# Patient Record
Sex: MALE | Race: BLACK OR AFRICAN AMERICAN | NOT HISPANIC OR LATINO | Employment: UNEMPLOYED | URBAN - METROPOLITAN AREA
[De-identification: names, ages, dates, MRNs, and addresses within clinical notes are randomized per-mention and may not be internally consistent; named-entity substitution may affect disease eponyms.]

---

## 2017-05-04 ENCOUNTER — APPOINTMENT (OUTPATIENT)
Dept: GENERAL RADIOLOGY | Facility: HOSPITAL | Age: 27
End: 2017-05-04

## 2017-05-04 ENCOUNTER — APPOINTMENT (OUTPATIENT)
Dept: CT IMAGING | Facility: HOSPITAL | Age: 27
End: 2017-05-04

## 2017-05-04 ENCOUNTER — HOSPITAL ENCOUNTER (EMERGENCY)
Facility: HOSPITAL | Age: 27
Discharge: PSYCHIATRIC OR SUBSTANCE USE FACILITY | End: 2017-05-05
Attending: EMERGENCY MEDICINE | Admitting: EMERGENCY MEDICINE

## 2017-05-04 DIAGNOSIS — F31.64 BIPOLAR DISORDER, CURRENT EPISODE MIXED, SEVERE, WITH PSYCHOTIC FEATURES (HCC): ICD-10-CM
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
## 2017-05-04 LAB
AMPHET+METHAMPHET UR QL: NEGATIVE
BACTERIA UR QL AUTO: ABNORMAL /HPF
BARBITURATES UR QL SCN: NEGATIVE
BENZODIAZ UR QL SCN: NEGATIVE
BILIRUB UR QL STRIP: NEGATIVE
CANNABINOIDS SERPL QL: NEGATIVE
CLARITY UR: CLEAR
COCAINE UR QL: NEGATIVE
COLOR UR: ABNORMAL
ETHANOL BLD-MCNC: <10 MG/DL (ref 0–10)
ETHANOL UR QL: <0.01 %
GLUCOSE UR STRIP-MCNC: NEGATIVE MG/DL
HGB UR QL STRIP.AUTO: NEGATIVE
HYALINE CASTS UR QL AUTO: ABNORMAL /LPF
KETONES UR QL STRIP: ABNORMAL
LEUKOCYTE ESTERASE UR QL STRIP.AUTO: ABNORMAL
METHADONE UR QL SCN: NEGATIVE
NITRITE UR QL STRIP: NEGATIVE
OPIATES UR QL: NEGATIVE
OXYCODONE UR QL SCN: NEGATIVE
PH UR STRIP.AUTO: 6 [PH] (ref 5–8)
PROT UR QL STRIP: ABNORMAL
RBC # UR: ABNORMAL /HPF
REF LAB TEST METHOD: ABNORMAL
SP GR UR STRIP: >=1.03 (ref 1–1.03)
SQUAMOUS #/AREA URNS HPF: ABNORMAL /HPF
UROBILINOGEN UR QL STRIP: ABNORMAL
WBC UR QL AUTO: ABNORMAL /HPF

## 2017-05-05 ENCOUNTER — HOSPITAL ENCOUNTER (INPATIENT)
Facility: HOSPITAL | Age: 27
LOS: 10 days | Discharge: HOME | End: 2017-05-15
Attending: SPECIALIST | Admitting: PSYCHIATRY & NEUROLOGY

## 2017-05-05 VITALS
HEART RATE: 68 BPM | OXYGEN SATURATION: 98 % | WEIGHT: 238 LBS | DIASTOLIC BLOOD PRESSURE: 84 MMHG | BODY MASS INDEX: 29.59 KG/M2 | RESPIRATION RATE: 18 BRPM | SYSTOLIC BLOOD PRESSURE: 132 MMHG | HEIGHT: 75 IN | TEMPERATURE: 98.3 F

## 2017-05-05 PROBLEM — F31.9 BIPOLAR DISORDER WITH PSYCHOTIC FEATURES (HCC): Status: ACTIVE | Noted: 2017-05-05

## 2017-05-05 RX ORDER — MAGNESIUM HYDROXIDE/ALUMINUM HYDROXICE/SIMETHICONE 120; 1200; 1200 MG/30ML; MG/30ML; MG/30ML
30 SUSPENSION ORAL EVERY 6 HOURS PRN
Status: DISCONTINUED | OUTPATIENT
Start: 2017-05-05 | End: 2017-05-15 | Stop reason: HOSPADM

## 2017-05-05 RX ORDER — LURASIDONE HYDROCHLORIDE 20 MG/1
20 TABLET, FILM COATED ORAL DAILY
Status: DISCONTINUED | OUTPATIENT
Start: 2017-05-05 | End: 2017-05-08

## 2017-05-05 RX ORDER — OLANZAPINE 10 MG/1
10 TABLET, ORALLY DISINTEGRATING ORAL ONCE
Status: COMPLETED | OUTPATIENT
Start: 2017-05-05 | End: 2017-05-05

## 2017-05-05 RX ORDER — ACETAMINOPHEN 325 MG/1
650 TABLET ORAL EVERY 4 HOURS PRN
Status: DISCONTINUED | OUTPATIENT
Start: 2017-05-05 | End: 2017-05-15 | Stop reason: HOSPADM

## 2017-05-05 RX ADMIN — OLANZAPINE 10 MG: 10 TABLET, ORALLY DISINTEGRATING ORAL at 02:09

## 2017-05-05 RX ADMIN — LURASIDONE HYDROCHLORIDE 20 MG: 20 TABLET, FILM COATED ORAL at 11:02

## 2017-05-06 PROBLEM — N17.9 AKI (ACUTE KIDNEY INJURY) (HCC): Status: ACTIVE | Noted: 2017-05-06

## 2017-05-06 LAB
ALBUMIN SERPL-MCNC: 4.4 G/DL (ref 3.5–5.2)
ALBUMIN/GLOB SERPL: 1.8 G/DL
ALP SERPL-CCNC: 79 U/L (ref 39–117)
ALT SERPL W P-5'-P-CCNC: 13 U/L (ref 1–41)
ANION GAP SERPL CALCULATED.3IONS-SCNC: 10.6 MMOL/L
AST SERPL-CCNC: 16 U/L (ref 1–40)
BACTERIA SPEC AEROBE CULT: NO GROWTH
BASOPHILS # BLD AUTO: 0.02 10*3/MM3 (ref 0–0.2)
BASOPHILS NFR BLD AUTO: 0.3 % (ref 0–1.5)
BILIRUB SERPL-MCNC: 0.9 MG/DL (ref 0.1–1.2)
BUN BLD-MCNC: 13 MG/DL (ref 6–20)
BUN/CREAT SERPL: 8.4 (ref 7–25)
CALCIUM SPEC-SCNC: 9.2 MG/DL (ref 8.6–10.5)
CHLORIDE SERPL-SCNC: 102 MMOL/L (ref 98–107)
CO2 SERPL-SCNC: 27.4 MMOL/L (ref 22–29)
CREAT BLD-MCNC: 1.54 MG/DL (ref 0.76–1.27)
DEPRECATED RDW RBC AUTO: 40.7 FL (ref 37–54)
EOSINOPHIL # BLD AUTO: 0.12 10*3/MM3 (ref 0–0.7)
EOSINOPHIL NFR BLD AUTO: 2 % (ref 0.3–6.2)
ERYTHROCYTE [DISTWIDTH] IN BLOOD BY AUTOMATED COUNT: 12.5 % (ref 11.5–14.5)
GFR SERPL CREATININE-BSD FRML MDRD: 66 ML/MIN/1.73
GLOBULIN UR ELPH-MCNC: 2.5 GM/DL
GLUCOSE BLD-MCNC: 103 MG/DL (ref 65–99)
HCT VFR BLD AUTO: 44.9 % (ref 40.4–52.2)
HGB BLD-MCNC: 14.3 G/DL (ref 13.7–17.6)
IMM GRANULOCYTES # BLD: 0 10*3/MM3 (ref 0–0.03)
IMM GRANULOCYTES NFR BLD: 0 % (ref 0–0.5)
LYMPHOCYTES # BLD AUTO: 1.44 10*3/MM3 (ref 0.9–4.8)
LYMPHOCYTES NFR BLD AUTO: 24.2 % (ref 19.6–45.3)
MCH RBC QN AUTO: 28.5 PG (ref 27–32.7)
MCHC RBC AUTO-ENTMCNC: 31.8 G/DL (ref 32.6–36.4)
MCV RBC AUTO: 89.6 FL (ref 79.8–96.2)
MONOCYTES # BLD AUTO: 0.61 10*3/MM3 (ref 0.2–1.2)
MONOCYTES NFR BLD AUTO: 10.3 % (ref 5–12)
NEUTROPHILS # BLD AUTO: 3.75 10*3/MM3 (ref 1.9–8.1)
NEUTROPHILS NFR BLD AUTO: 63.2 % (ref 42.7–76)
PLATELET # BLD AUTO: 114 10*3/MM3 (ref 140–500)
PMV BLD AUTO: 10.9 FL (ref 6–12)
POTASSIUM BLD-SCNC: 4.3 MMOL/L (ref 3.5–5.2)
PROT SERPL-MCNC: 6.9 G/DL (ref 6–8.5)
RBC # BLD AUTO: 5.01 10*6/MM3 (ref 4.6–6)
SODIUM BLD-SCNC: 140 MMOL/L (ref 136–145)
TSH SERPL DL<=0.05 MIU/L-ACNC: 0.99 MIU/ML (ref 0.27–4.2)
WBC NRBC COR # BLD: 5.94 10*3/MM3 (ref 4.5–10.7)

## 2017-05-06 PROCEDURE — 84443 ASSAY THYROID STIM HORMONE: CPT | Performed by: INTERNAL MEDICINE

## 2017-05-06 PROCEDURE — 85025 COMPLETE CBC W/AUTO DIFF WBC: CPT | Performed by: INTERNAL MEDICINE

## 2017-05-06 PROCEDURE — 80053 COMPREHEN METABOLIC PANEL: CPT | Performed by: INTERNAL MEDICINE

## 2017-05-06 RX ORDER — SODIUM CHLORIDE 9 MG/ML
100 INJECTION, SOLUTION INTRAVENOUS CONTINUOUS
Status: ACTIVE | OUTPATIENT
Start: 2017-05-06 | End: 2017-05-06

## 2017-05-06 RX ORDER — OLANZAPINE 10 MG/1
10 INJECTION, POWDER, LYOPHILIZED, FOR SOLUTION INTRAMUSCULAR ONCE
Status: COMPLETED | OUTPATIENT
Start: 2017-05-06 | End: 2017-05-06

## 2017-05-06 RX ADMIN — OLANZAPINE 10 MG: 10 INJECTION, POWDER, FOR SOLUTION INTRAMUSCULAR at 10:06

## 2017-05-07 LAB
ANION GAP SERPL CALCULATED.3IONS-SCNC: 10.6 MMOL/L
BUN BLD-MCNC: 13 MG/DL (ref 6–20)
BUN/CREAT SERPL: 8 (ref 7–25)
CALCIUM SPEC-SCNC: 9.1 MG/DL (ref 8.6–10.5)
CHLORIDE SERPL-SCNC: 101 MMOL/L (ref 98–107)
CO2 SERPL-SCNC: 28.4 MMOL/L (ref 22–29)
CREAT BLD-MCNC: 1.62 MG/DL (ref 0.76–1.27)
CREAT UR-MCNC: 325.9 MG/DL
GFR SERPL CREATININE-BSD FRML MDRD: 62 ML/MIN/1.73
GLUCOSE BLD-MCNC: 100 MG/DL (ref 65–99)
POTASSIUM BLD-SCNC: 4.4 MMOL/L (ref 3.5–5.2)
PROT UR-MCNC: 9 MG/DL
PROT/CREAT UR: 27.6 MG/G CREA
SODIUM BLD-SCNC: 140 MMOL/L (ref 136–145)

## 2017-05-07 PROCEDURE — 76775 US EXAM ABDO BACK WALL LIM: CPT

## 2017-05-07 PROCEDURE — 82570 ASSAY OF URINE CREATININE: CPT | Performed by: INTERNAL MEDICINE

## 2017-05-07 PROCEDURE — 84156 ASSAY OF PROTEIN URINE: CPT | Performed by: INTERNAL MEDICINE

## 2017-05-07 PROCEDURE — 80048 BASIC METABOLIC PNL TOTAL CA: CPT | Performed by: INTERNAL MEDICINE

## 2017-05-07 RX ORDER — SODIUM CHLORIDE 9 MG/ML
100 INJECTION, SOLUTION INTRAVENOUS CONTINUOUS
Status: ACTIVE | OUTPATIENT
Start: 2017-05-07 | End: 2017-05-07

## 2017-05-07 RX ORDER — OLANZAPINE 5 MG/1
10 TABLET, ORALLY DISINTEGRATING ORAL ONCE
Status: COMPLETED | OUTPATIENT
Start: 2017-05-07 | End: 2017-05-07

## 2017-05-07 RX ADMIN — OLANZAPINE 10 MG: 5 TABLET, ORALLY DISINTEGRATING ORAL at 16:12

## 2017-05-07 RX ADMIN — ACETAMINOPHEN 650 MG: 325 TABLET ORAL at 17:19

## 2017-05-07 RX ADMIN — SODIUM CHLORIDE 100 ML/HR: 9 INJECTION, SOLUTION INTRAVENOUS at 10:43

## 2017-05-07 RX ADMIN — SODIUM CHLORIDE 100 ML/HR: 9 INJECTION, SOLUTION INTRAVENOUS at 20:23

## 2017-05-08 PROBLEM — D69.1 THROMBOCYTOPATHIA (HCC): Status: ACTIVE | Noted: 2017-05-08

## 2017-05-08 LAB
ANION GAP SERPL CALCULATED.3IONS-SCNC: 10.5 MMOL/L
BUN BLD-MCNC: 12 MG/DL (ref 6–20)
BUN/CREAT SERPL: 8.2 (ref 7–25)
CALCIUM SPEC-SCNC: 8.9 MG/DL (ref 8.6–10.5)
CHLORIDE SERPL-SCNC: 104 MMOL/L (ref 98–107)
CO2 SERPL-SCNC: 26.5 MMOL/L (ref 22–29)
CREAT BLD-MCNC: 1.46 MG/DL (ref 0.76–1.27)
GFR SERPL CREATININE-BSD FRML MDRD: 70 ML/MIN/1.73
GLUCOSE BLD-MCNC: 94 MG/DL (ref 65–99)
HBA1C MFR BLD: 4.38 % (ref 4.8–5.6)
POTASSIUM BLD-SCNC: 4.1 MMOL/L (ref 3.5–5.2)
SODIUM BLD-SCNC: 141 MMOL/L (ref 136–145)

## 2017-05-08 PROCEDURE — 83036 HEMOGLOBIN GLYCOSYLATED A1C: CPT | Performed by: INTERNAL MEDICINE

## 2017-05-08 PROCEDURE — 80048 BASIC METABOLIC PNL TOTAL CA: CPT | Performed by: INTERNAL MEDICINE

## 2017-05-08 RX ORDER — LURASIDONE HYDROCHLORIDE 40 MG/1
40 TABLET, FILM COATED ORAL DAILY
Status: DISCONTINUED | OUTPATIENT
Start: 2017-05-09 | End: 2017-05-09

## 2017-05-08 RX ADMIN — LURASIDONE HYDROCHLORIDE 20 MG: 20 TABLET, FILM COATED ORAL at 09:01

## 2017-05-09 LAB
ANION GAP SERPL CALCULATED.3IONS-SCNC: 12.5 MMOL/L
BASOPHILS # BLD AUTO: 0.02 10*3/MM3 (ref 0–0.2)
BASOPHILS NFR BLD AUTO: 0.4 % (ref 0–1.5)
BUN BLD-MCNC: 11 MG/DL (ref 6–20)
BUN/CREAT SERPL: 8.3 (ref 7–25)
CALCIUM SPEC-SCNC: 8.7 MG/DL (ref 8.6–10.5)
CHLORIDE SERPL-SCNC: 102 MMOL/L (ref 98–107)
CO2 SERPL-SCNC: 25.5 MMOL/L (ref 22–29)
CREAT BLD-MCNC: 1.33 MG/DL (ref 0.76–1.27)
DEPRECATED RDW RBC AUTO: 38 FL (ref 37–54)
EOSINOPHIL # BLD AUTO: 0.15 10*3/MM3 (ref 0–0.7)
EOSINOPHIL NFR BLD AUTO: 2.9 % (ref 0.3–6.2)
ERYTHROCYTE [DISTWIDTH] IN BLOOD BY AUTOMATED COUNT: 11.9 % (ref 11.5–14.5)
GFR SERPL CREATININE-BSD FRML MDRD: 78 ML/MIN/1.73
GLUCOSE BLD-MCNC: 125 MG/DL (ref 65–99)
HCT VFR BLD AUTO: 40.9 % (ref 40.4–52.2)
HGB BLD-MCNC: 13.4 G/DL (ref 13.7–17.6)
IMM GRANULOCYTES # BLD: 0 10*3/MM3 (ref 0–0.03)
IMM GRANULOCYTES NFR BLD: 0 % (ref 0–0.5)
LYMPHOCYTES # BLD AUTO: 2.11 10*3/MM3 (ref 0.9–4.8)
LYMPHOCYTES NFR BLD AUTO: 41.1 % (ref 19.6–45.3)
MCH RBC QN AUTO: 28.8 PG (ref 27–32.7)
MCHC RBC AUTO-ENTMCNC: 32.8 G/DL (ref 32.6–36.4)
MCV RBC AUTO: 88 FL (ref 79.8–96.2)
MONOCYTES # BLD AUTO: 0.42 10*3/MM3 (ref 0.2–1.2)
MONOCYTES NFR BLD AUTO: 8.2 % (ref 5–12)
NEUTROPHILS # BLD AUTO: 2.44 10*3/MM3 (ref 1.9–8.1)
NEUTROPHILS NFR BLD AUTO: 47.4 % (ref 42.7–76)
PLATELET # BLD AUTO: 122 10*3/MM3 (ref 140–500)
PMV BLD AUTO: 11.3 FL (ref 6–12)
POTASSIUM BLD-SCNC: 3.5 MMOL/L (ref 3.5–5.2)
RBC # BLD AUTO: 4.65 10*6/MM3 (ref 4.6–6)
SODIUM BLD-SCNC: 140 MMOL/L (ref 136–145)
WBC NRBC COR # BLD: 5.14 10*3/MM3 (ref 4.5–10.7)

## 2017-05-09 PROCEDURE — 85025 COMPLETE CBC W/AUTO DIFF WBC: CPT | Performed by: INTERNAL MEDICINE

## 2017-05-09 PROCEDURE — 80048 BASIC METABOLIC PNL TOTAL CA: CPT | Performed by: INTERNAL MEDICINE

## 2017-05-09 RX ORDER — OLANZAPINE 7.5 MG/1
15 TABLET ORAL NIGHTLY
Status: DISCONTINUED | OUTPATIENT
Start: 2017-05-09 | End: 2017-05-11

## 2017-05-09 RX ORDER — OLANZAPINE 10 MG/1
10 INJECTION, POWDER, LYOPHILIZED, FOR SOLUTION INTRAMUSCULAR EVERY 8 HOURS PRN
Status: COMPLETED | OUTPATIENT
Start: 2017-05-09 | End: 2017-05-11

## 2017-05-09 RX ADMIN — LURASIDONE HYDROCHLORIDE 40 MG: 40 TABLET, FILM COATED ORAL at 08:57

## 2017-05-09 RX ADMIN — OLANZAPINE 15 MG: 7.5 TABLET, FILM COATED ORAL at 22:56

## 2017-05-10 RX ORDER — HYDROXYZINE PAMOATE 25 MG/1
50 CAPSULE ORAL EVERY 4 HOURS PRN
Status: DISCONTINUED | OUTPATIENT
Start: 2017-05-10 | End: 2017-05-15 | Stop reason: HOSPADM

## 2017-05-10 RX ADMIN — HYDROXYZINE PAMOATE 50 MG: 25 CAPSULE ORAL at 09:40

## 2017-05-10 RX ADMIN — OLANZAPINE 15 MG: 7.5 TABLET, FILM COATED ORAL at 20:55

## 2017-05-11 RX ORDER — OLANZAPINE 10 MG/1
20 TABLET ORAL NIGHTLY
Status: DISCONTINUED | OUTPATIENT
Start: 2017-05-11 | End: 2017-05-15 | Stop reason: HOSPADM

## 2017-05-11 RX ADMIN — OLANZAPINE 10 MG: 10 INJECTION, POWDER, FOR SOLUTION INTRAMUSCULAR at 14:08

## 2017-05-11 RX ADMIN — OLANZAPINE 20 MG: 10 TABLET, FILM COATED ORAL at 20:30

## 2017-05-12 RX ORDER — OLANZAPINE 10 MG/1
10 INJECTION, POWDER, LYOPHILIZED, FOR SOLUTION INTRAMUSCULAR EVERY 8 HOURS PRN
Status: COMPLETED | OUTPATIENT
Start: 2017-05-12 | End: 2017-05-12

## 2017-05-12 RX ADMIN — OLANZAPINE 20 MG: 10 TABLET, FILM COATED ORAL at 21:39

## 2017-05-12 RX ADMIN — HYDROXYZINE PAMOATE 50 MG: 25 CAPSULE ORAL at 21:39

## 2017-05-12 RX ADMIN — OLANZAPINE 10 MG: 10 INJECTION, POWDER, FOR SOLUTION INTRAMUSCULAR at 14:25

## 2017-05-13 RX ADMIN — OLANZAPINE 20 MG: 10 TABLET, FILM COATED ORAL at 21:23

## 2017-05-14 RX ADMIN — OLANZAPINE 20 MG: 10 TABLET, FILM COATED ORAL at 21:25

## 2017-05-15 VITALS
BODY MASS INDEX: 27.62 KG/M2 | RESPIRATION RATE: 16 BRPM | WEIGHT: 221 LBS | SYSTOLIC BLOOD PRESSURE: 102 MMHG | HEART RATE: 52 BPM | DIASTOLIC BLOOD PRESSURE: 70 MMHG | TEMPERATURE: 96.5 F | OXYGEN SATURATION: 100 %

## 2017-05-15 RX ORDER — HYDROXYZINE PAMOATE 50 MG/1
50 CAPSULE ORAL EVERY 4 HOURS PRN
Qty: 75 CAPSULE | Refills: 0 | Status: SHIPPED | OUTPATIENT
Start: 2017-05-15

## 2017-05-15 RX ORDER — OLANZAPINE 20 MG/1
20 TABLET ORAL NIGHTLY
Qty: 30 TABLET | Refills: 1 | Status: SHIPPED | OUTPATIENT
Start: 2017-05-15

## 2017-05-16 ENCOUNTER — TELEPHONE (OUTPATIENT)
Dept: PSYCHIATRY | Facility: HOSPITAL | Age: 27
End: 2017-05-16

## 2018-02-09 ENCOUNTER — HOSPITAL ENCOUNTER (EMERGENCY)
Facility: HOSPITAL | Age: 28
Discharge: HOME | End: 2018-02-09
Attending: EMERGENCY MEDICINE | Admitting: EMERGENCY MEDICINE

## 2018-02-09 VITALS
HEART RATE: 73 BPM | OXYGEN SATURATION: 96 % | HEIGHT: 75 IN | DIASTOLIC BLOOD PRESSURE: 84 MMHG | WEIGHT: 238 LBS | RESPIRATION RATE: 16 BRPM | TEMPERATURE: 98.4 F | SYSTOLIC BLOOD PRESSURE: 146 MMHG | BODY MASS INDEX: 29.59 KG/M2

## 2018-02-09 DIAGNOSIS — F31.70 BIPOLAR DISORDER IN PARTIAL REMISSION, MOST RECENT EPISODE UNSPECIFIED TYPE (HCC): Primary | ICD-10-CM

## 2018-02-09 LAB
AMPHET+METHAMPHET UR QL: NEGATIVE
BARBITURATES UR QL SCN: NEGATIVE
BENZODIAZ UR QL SCN: NEGATIVE
CANNABINOIDS SERPL QL: NEGATIVE
COCAINE UR QL: NEGATIVE
ETHANOL BLD-MCNC: <10 MG/DL (ref 0–10)
ETHANOL UR QL: <0.01 %
HOLD SPECIMEN: NORMAL
HOLD SPECIMEN: NORMAL
METHADONE UR QL SCN: NEGATIVE
OPIATES UR QL: NEGATIVE
OXYCODONE UR QL SCN: NEGATIVE
WHOLE BLOOD HOLD SPECIMEN: NORMAL
WHOLE BLOOD HOLD SPECIMEN: NORMAL

## 2018-02-09 PROCEDURE — 36415 COLL VENOUS BLD VENIPUNCTURE: CPT | Performed by: EMERGENCY MEDICINE

## 2018-02-09 PROCEDURE — 80307 DRUG TEST PRSMV CHEM ANLYZR: CPT | Performed by: EMERGENCY MEDICINE

## 2018-02-09 PROCEDURE — 90791 PSYCH DIAGNOSTIC EVALUATION: CPT

## 2018-02-09 PROCEDURE — 99283 EMERGENCY DEPT VISIT LOW MDM: CPT

## 2018-02-09 RX ORDER — OLANZAPINE 10 MG/1
10 TABLET ORAL NIGHTLY
Qty: 30 TABLET | Refills: 0 | Status: SHIPPED | OUTPATIENT
Start: 2018-02-09
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
## 2018-02-10 NOTE — DISCHARGE INSTRUCTIONS
Home, rest, home medicine as prescribed, follow up with resources given to you by the [facility].  Return to care with further concerns.

## 2018-02-10 NOTE — ED NOTES
"Pt states that occasionally he will experience a \"trigger\" that makes him feel really anxious and violent.  States he \"gets scared\" about what he \"might do\"  Denies having any suicidal or homicidal ideation at present.  Parents in the room add that patient was to have a court ordered psychiatric evaluation prior to being released from [facility] recently that was never done, so they are bringing him here for evaluation       Zaira Herrera RN  02/09/18 [id number]    "

## 2018-11-10 ENCOUNTER — HOSPITAL ENCOUNTER (EMERGENCY)
Facility: HOSPITAL | Age: 28
Discharge: HOME | End: 2018-11-11
Attending: EMERGENCY MEDICINE | Admitting: EMERGENCY MEDICINE

## 2018-11-10 DIAGNOSIS — R55 NEAR SYNCOPE: Primary | ICD-10-CM

## 2018-11-10 DIAGNOSIS — R53.1 GENERALIZED WEAKNESS: ICD-10-CM

## 2018-11-10 PROCEDURE — 99283 EMERGENCY DEPT VISIT LOW MDM: CPT

## 2018-11-11 VITALS
SYSTOLIC BLOOD PRESSURE: 122 MMHG | OXYGEN SATURATION: 99 % | WEIGHT: 225 LBS | HEART RATE: 63 BPM | HEIGHT: 75 IN | TEMPERATURE: 96.5 F | RESPIRATION RATE: 16 BRPM | BODY MASS INDEX: 27.98 KG/M2 | DIASTOLIC BLOOD PRESSURE: 72 MMHG

## 2018-11-11 LAB
ALBUMIN SERPL-MCNC: 4.1 G/DL (ref 3.5–5.2)
ALBUMIN/GLOB SERPL: 1.6 G/DL
ALP SERPL-CCNC: 62 U/L (ref 39–117)
ALT SERPL W P-5'-P-CCNC: 11 U/L (ref 1–41)
ANION GAP SERPL CALCULATED.3IONS-SCNC: 9.2 MMOL/L
AST SERPL-CCNC: 18 U/L (ref 1–40)
BASOPHILS # BLD AUTO: 0.02 10*3/MM3 (ref 0–0.2)
BASOPHILS NFR BLD AUTO: 0.3 % (ref 0–1.5)
BILIRUB SERPL-MCNC: 0.7 MG/DL (ref 0.1–1.2)
BUN BLD-MCNC: 12 MG/DL (ref 6–20)
BUN/CREAT SERPL: 8.6 (ref 7–25)
CALCIUM SPEC-SCNC: 8.8 MG/DL (ref 8.6–10.5)
CHLORIDE SERPL-SCNC: 102 MMOL/L (ref 98–107)
CO2 SERPL-SCNC: 28.8 MMOL/L (ref 22–29)
CREAT BLD-MCNC: 1.39 MG/DL (ref 0.76–1.27)
DEPRECATED RDW RBC AUTO: 38.1 FL (ref 37–54)
EOSINOPHIL # BLD AUTO: 0.12 10*3/MM3 (ref 0–0.7)
EOSINOPHIL NFR BLD AUTO: 2 % (ref 0.3–6.2)
ERYTHROCYTE [DISTWIDTH] IN BLOOD BY AUTOMATED COUNT: 11.9 % (ref 11.5–14.5)
GFR SERPL CREATININE-BSD FRML MDRD: 73 ML/MIN/1.73
GLOBULIN UR ELPH-MCNC: 2.5 GM/DL
GLUCOSE BLD-MCNC: 109 MG/DL (ref 65–99)
HCT VFR BLD AUTO: 40.8 % (ref 40.4–52.2)
HGB BLD-MCNC: 13.2 G/DL (ref 13.7–17.6)
IMM GRANULOCYTES # BLD: 0 10*3/MM3 (ref 0–0.03)
IMM GRANULOCYTES NFR BLD: 0 % (ref 0–0.5)
LYMPHOCYTES # BLD AUTO: 2.35 10*3/MM3 (ref 0.9–4.8)
LYMPHOCYTES NFR BLD AUTO: 38.7 % (ref 19.6–45.3)
MCH RBC QN AUTO: 28.8 PG (ref 27–32.7)
MCHC RBC AUTO-ENTMCNC: 32.4 G/DL (ref 32.6–36.4)
MCV RBC AUTO: 88.9 FL (ref 79.8–96.2)
MONOCYTES # BLD AUTO: 0.55 10*3/MM3 (ref 0.2–1.2)
MONOCYTES NFR BLD AUTO: 9 % (ref 5–12)
NEUTROPHILS # BLD AUTO: 3.04 10*3/MM3 (ref 1.9–8.1)
NEUTROPHILS NFR BLD AUTO: 50 % (ref 42.7–76)
PLATELET # BLD AUTO: 126 10*3/MM3 (ref 140–500)
PMV BLD AUTO: 11.2 FL (ref 6–12)
POTASSIUM BLD-SCNC: 3.7 MMOL/L (ref 3.5–5.2)
PROT SERPL-MCNC: 6.6 G/DL (ref 6–8.5)
RBC # BLD AUTO: 4.59 10*6/MM3 (ref 4.6–6)
SODIUM BLD-SCNC: 140 MMOL/L (ref 136–145)
WBC NRBC COR # BLD: 6.08 10*3/MM3 (ref 4.5–10.7)

## 2018-11-11 PROCEDURE — 80053 COMPREHEN METABOLIC PANEL: CPT | Performed by: EMERGENCY MEDICINE

## 2018-11-11 PROCEDURE — 93005 ELECTROCARDIOGRAM TRACING: CPT | Performed by: EMERGENCY MEDICINE

## 2018-11-11 PROCEDURE — 36415 COLL VENOUS BLD VENIPUNCTURE: CPT

## 2018-11-11 PROCEDURE — 93010 ELECTROCARDIOGRAM REPORT: CPT | Performed by: INTERNAL MEDICINE

## 2018-11-11 PROCEDURE — 85025 COMPLETE CBC W/AUTO DIFF WBC: CPT | Performed by: EMERGENCY MEDICINE

## 2018-11-11 NOTE — ED PROVIDER NOTES
" EMERGENCY DEPARTMENT ENCOUNTER    CHIEF COMPLAINT  Chief Complaint: Syncope  History given by: pt  History limited by: none  Room Number: 10/10  PMD: Provider, No Known      HPI:  Pt is a 28 y.o. male who presents complaining of syncope today. Pt reports he has not been sleeping well lately and reports when he was driving tonight he pulled over to take a nap and when he woke up he felt like he \"blacked out\" and then felt his heart racing. Pt reports now he feels better. Pt denies any pain or discomfort.    Duration:  PTA  Onset: gradual  Quality: \"blacked out\"  Intensity/Severity: mild  Progression: progressively better  Associated Symptoms: lack of sleep  Aggravating Factors: none  Alleviating Factors: none  Previous Episodes: none  Treatment before arrival: none    PAST MEDICAL HISTORY  Active Ambulatory Problems     Diagnosis Date Noted   • Bipolar disorder with psychotic features (CMS/[facility]) 05/05/2017   • JACQUELYN (acute kidney injury) (CMS/[facility]) 05/06/2017   • Thrombocytopathia (CMS/[facility]) 05/08/2017     Resolved Ambulatory Problems     Diagnosis Date Noted   • No Resolved Ambulatory Problems     Past Medical History:   Diagnosis Date   • Bipolar disorder (CMS/[facility])    • 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 No pertinent surgical history.    FAMILY HISTORY  No family history on file.    SOCIAL HISTORY  Social History     Socioeconomic History   • Marital status: Single     Spouse name: Not on file   • Number of children: Not on file   • Years of education: Not on file   • Highest education level: Not on file   Social Needs   • Financial resource strain: Not on file   • Food insecurity - worry: Not on file   • Food insecurity - inability: Not on file   • Transportation needs - medical: Not on file   • Transportation needs - non-medical: Not on file   Occupational History   • Not on file   Tobacco Use   • Smoking status: Former Smoker   • Tobacco comment: quit " March 7   Substance and Sexual Activity   • Alcohol use: No   • Drug use: Yes     Types: Marijuana   • Sexual activity: Defer   Other Topics Concern   • Not on file   Social History Narrative   • Not on file       ALLERGIES  Latex    REVIEW OF SYSTEMS  Review of Systems   Constitutional: Negative for activity change, appetite change and fever.   HENT: Negative for congestion and sore throat.    Eyes: Negative.    Respiratory: Negative for cough and shortness of breath.    Cardiovascular: Negative for chest pain and leg swelling.   Gastrointestinal: Negative for abdominal pain, diarrhea and vomiting.   Endocrine: Negative.    Genitourinary: Negative for decreased urine volume and dysuria.   Musculoskeletal: Negative for neck pain.   Skin: Negative for rash and wound.   Allergic/Immunologic: Negative.    Neurological: Positive for syncope. Negative for weakness, numbness and headaches.   Hematological: Negative.    Psychiatric/Behavioral: Positive for sleep disturbance.   All other systems reviewed and are negative.      PHYSICAL EXAM  ED Triage Vitals [11/10/18 2355]   Temp Heart Rate Resp BP SpO2   96.5 °F (35.8 °C) 63 16 135/96 100 %      Temp src Heart Rate Source Patient Position BP Location FiO2 (%)   Tympanic -- -- -- --       Physical Exam   Constitutional: He is oriented to person, place, and time. No distress.   HENT:   Head: Normocephalic and atraumatic.   Eyes: EOM are normal. Pupils are equal, round, and reactive to light.   Neck: Normal range of motion. Neck supple.   Cardiovascular: Normal rate, regular rhythm and normal heart sounds.   Pulmonary/Chest: Effort normal and breath sounds normal. No respiratory distress.   Abdominal: Soft. There is no tenderness. There is no rebound and no guarding.   Musculoskeletal: Normal range of motion. He exhibits no edema.   Neurological: He is alert and oriented to person, place, and time. He has normal sensation and normal strength.   Skin: Skin is warm and dry.    Psychiatric: Mood and affect normal.   Nursing note and vitals reviewed.      LAB RESULTS  Lab Results (last 24 hours)     Procedure Component Value Units Date/Time    CBC & Differential [[id number]] Collected:  11/11/18 0039    Specimen:  Blood Updated:  11/11/18 0055    Narrative:       The following orders were created for panel order CBC & Differential.  Procedure                               Abnormality         Status                     ---------                               -----------         ------                     CBC Auto Differential[[id number]]        Abnormal            Final result                 Please view results for these tests on the individual orders.    Comprehensive Metabolic Panel [[id number]]  (Abnormal) Collected:  11/11/18 0039    Specimen:  Blood Updated:  11/11/18 0117     Glucose 109 mg/dL      BUN 12 mg/dL      Creatinine 1.39 mg/dL      Sodium 140 mmol/L      Potassium 3.7 mmol/L      Chloride 102 mmol/L      CO2 28.8 mmol/L      Calcium 8.8 mg/dL      Total Protein 6.6 g/dL      Albumin 4.10 g/dL      ALT (SGPT) 11 U/L      AST (SGOT) 18 U/L      Alkaline Phosphatase 62 U/L      Total Bilirubin 0.7 mg/dL      eGFR  African Amer 73 mL/min/1.73      Globulin 2.5 gm/dL      A/G Ratio 1.6 g/dL      BUN/Creatinine Ratio 8.6     Anion Gap 9.2 mmol/L     CBC Auto Differential [[id number]]  (Abnormal) Collected:  11/11/18 0039    Specimen:  Blood Updated:  11/11/18 0055     WBC 6.08 10*3/mm3      RBC 4.59 10*6/mm3      Hemoglobin 13.2 g/dL      Hematocrit 40.8 %      MCV 88.9 fL      MCH 28.8 pg      MCHC 32.4 g/dL      RDW 11.9 %      RDW-SD 38.1 fl      MPV 11.2 fL      Platelets 126 10*3/mm3      Neutrophil % 50.0 %      Lymphocyte % 38.7 %      Monocyte % 9.0 %      Eosinophil % 2.0 %      Basophil % 0.3 %      Immature Grans % 0.0 %      Neutrophils, Absolute 3.04 10*3/mm3      Lymphocytes, Absolute 2.35 10*3/mm3      Monocytes, Absolute 0.55 10*3/mm3      Eosinophils, Absolute 0.12  10*3/mm3      Basophils, Absolute 0.02 10*3/mm3      Immature Grans, Absolute 0.00 10*3/mm3           I ordered the above labs and reviewed the results      PROCEDURES  Procedures    EKG         EKG time: 0011   Rhythm/Rate: sinus rhythm, rate: 51  Normal P waves and normal SC interval   Normal QRS, Normal axis  Normal ST and T waves    Interpreted Contemporaneously by me, independently viewed  No previous for comparison      PROGRESS AND CONSULTS     0006  Ordered EKG for further viewing.    0028  Ordered lab results for further viewing.    0216  Discussed pt lab result with pt dad.    0224  Rechecked patient who is resting comfortably. Discussed all lab results. Discussed plan to discharge. Pt understands and agrees with the plan. All questions have been answered.        MEDICAL DECISION MAKING  Results were reviewed/discussed with the patient and they were also made aware of online access. Pt also made aware that some labs, such as cultures, will not be resulted during ER visit and follow up with PMD is necessary.     MDM  Number of Diagnoses or Management Options     Amount and/or Complexity of Data Reviewed  Clinical lab tests: ordered and reviewed (Unremarkable)  Tests in the medicine section of CPT®: ordered and reviewed (Refer to the procedure section of the note for EKG results)           DIAGNOSIS  Final diagnoses:   Near syncope   Generalized weakness       DISPOSITION  DISCHARGE    Patient discharged in stable condition.    Reviewed implications of results, diagnosis, meds, responsibility to follow up, warning signs and symptoms of possible worsening, potential complications and reasons to return to ER.    Patient/Family voiced understanding of above instructions.    Discussed plan for discharge, as there is no emergent indication for admission. Patient referred to primary care provider for BP management due to today's BP. Pt/family is agreeable and understands need for follow up and repeat testing.  Pt  is aware that discharge does not mean that nothing is wrong but it indicates no emergency is present that requires admission and they must continue care with follow-up as given below or physician of their choice.     FOLLOW-UP  [facility] REFERRAL SERVICE  [facility] [address] [phone]  Schedule an appointment as soon as possible for a visit            Medication List      No changes were made to your prescriptions during this visit.           Latest Documented Vital Signs:  As of 3:31 AM  BP- 122/72 HR- 63 Temp- 96.5 °F (35.8 °C) (Tympanic) O2 sat- 99%    --  Documentation assistance provided by nevaeh Isaacs for Dr Murray.  Information recorded by the scribe was done at my direction and has been verified and validated by me.          Arlene Isaacs  11/11/18 8899       Theodore Murray MD  11/11/18 4861

## 2018-11-11 NOTE — ED TRIAGE NOTES
To ER via EMS.  Pt was driving from [facility] to home in [place].  Pt felt tired so he pulled over into a parking lot and took a nap.  Pt states he then work up and soon passed out.  When he awoke he felt like his heart was racing.  He was afraid to drive so he called EMS.  Pt was picked up at [facility] and [facility].

## 2018-11-20 ENCOUNTER — APPOINTMENT (OUTPATIENT)
Dept: CT IMAGING | Facility: HOSPITAL | Age: 28
End: 2018-11-20

## 2018-11-20 ENCOUNTER — HOSPITAL ENCOUNTER (EMERGENCY)
Facility: HOSPITAL | Age: 28
Discharge: HOME | End: 2018-11-20
Attending: EMERGENCY MEDICINE | Admitting: EMERGENCY MEDICINE

## 2018-11-20 ENCOUNTER — APPOINTMENT (OUTPATIENT)
Dept: GENERAL RADIOLOGY | Facility: HOSPITAL | Age: 28
End: 2018-11-20

## 2018-11-20 VITALS
SYSTOLIC BLOOD PRESSURE: 129 MMHG | DIASTOLIC BLOOD PRESSURE: 79 MMHG | HEIGHT: 75 IN | TEMPERATURE: 97.5 F | RESPIRATION RATE: 16 BRPM | OXYGEN SATURATION: 100 % | WEIGHT: 216.2 LBS | HEART RATE: 53 BPM | BODY MASS INDEX: 26.88 KG/M2

## 2018-11-20 DIAGNOSIS — R07.89 ATYPICAL CHEST PAIN: Primary | ICD-10-CM

## 2018-11-20 DIAGNOSIS — F41.9 ANXIETY: ICD-10-CM

## 2018-11-20 PROCEDURE — 71250 CT THORAX DX C-: CPT

## 2018-11-20 PROCEDURE — 99283 EMERGENCY DEPT VISIT LOW MDM: CPT

## 2018-11-20 PROCEDURE — 93010 ELECTROCARDIOGRAM REPORT: CPT | Performed by: INTERNAL MEDICINE

## 2018-11-20 PROCEDURE — 71046 X-RAY EXAM CHEST 2 VIEWS: CPT

## 2018-11-20 PROCEDURE — 93005 ELECTROCARDIOGRAM TRACING: CPT | Performed by: EMERGENCY MEDICINE

## 2018-11-20 NOTE — ED PROVIDER NOTES
EMERGENCY DEPARTMENT ENCOUNTER    CHIEF COMPLAINT  Chief Complaint:  Anxiety  History given by: patient   History limited by: n/a  Room Number: 15/15  PMD: Provider, No Known      HPI:  Pt is a 28 y.o. male who presents for evaluation of anxiety. Pt states that he was sitting in his car this morning, when he began to feel a tingling sensation in his left arm that radiated into his chest. Pt states that he became anxious, and punched our his car window, prior to calling EMS. Currently, he states that his sx have improved. He denies EtOH or EtOH use.     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 No pertinent surgical history.    FAMILY HISTORY  History reviewed. No pertinent family history.    SOCIAL HISTORY  Social History     Socioeconomic History   • Marital status: Single     Spouse name: Not on file   • Number of children: Not on file   • Years of education: Not on file   • Highest education level: Not on file   Social Needs   • Financial resource strain: Not on file   • Food insecurity - worry: Not on file   • Food insecurity - inability: Not on file   • Transportation needs - medical: Not on file   • Transportation needs - non-medical: Not on file   Occupational History   • Not on file  "  Tobacco Use   • Smoking status: Former Smoker   • Tobacco comment: quit March 7   Substance and Sexual Activity   • Alcohol use: No   • Drug use: Yes     Types: Marijuana   • Sexual activity: Defer   Other Topics Concern   • Not on file   Social History Narrative   • Not on file       ALLERGIES  Latex    REVIEW OF SYSTEMS  Review of Systems   Constitutional: Negative for activity change, appetite change and fever.   HENT: Negative for congestion and sore throat.    Eyes: Negative.    Respiratory: Negative for cough and shortness of breath.    Cardiovascular: Positive for chest pain (\"tingling\"). Negative for leg swelling.   Gastrointestinal: Negative for abdominal pain, diarrhea and vomiting.   Endocrine: Negative.    Genitourinary: Negative for decreased urine volume and dysuria.   Musculoskeletal: Negative for neck pain.   Skin: Negative for rash and wound.   Allergic/Immunologic: Negative.    Neurological: Negative for weakness, numbness and headaches.   Hematological: Negative.    Psychiatric/Behavioral: The patient is nervous/anxious.    All other systems reviewed and are negative.      PHYSICAL EXAM  ED Triage Vitals   Temp Heart Rate Resp BP SpO2   11/20/18 0511 11/20/18 0509 11/20/18 0509 11/20/18 0509 11/20/18 0509   97.5 °F (36.4 °C) 62 16 127/60 99 %      Temp src Heart Rate Source Patient Position BP Location FiO2 (%)   11/20/18 0511 11/20/18 0509 -- -- --   Tympanic Monitor          Physical Exam   Constitutional: He is oriented to person, place, and time. No distress.   HENT:   Head: Normocephalic and atraumatic.   Eyes: EOM are normal. Pupils are equal, round, and reactive to light.   Neck: Normal range of motion. Neck supple.   Cardiovascular: Normal rate, regular rhythm and normal heart sounds.   Pulmonary/Chest: Effort normal and breath sounds normal. No respiratory distress.   Abdominal: Soft. There is no tenderness. There is no rebound and no guarding.   Musculoskeletal: Normal range of motion. " He exhibits no edema.   Abrasions over the knuckles of the right hand   Neurological: He is alert and oriented to person, place, and time. He has normal sensation and normal strength.   Skin: Skin is warm and dry.   Psychiatric: Mood and affect normal.   Nursing note and vitals reviewed.      RADIOLOGY  XR Chest 2 View   Final Result      CT Chest Without Contrast    (Results Pending)   CT chest shows NAD    I ordered the above noted radiological studies. Interpreted by radiologist.  Reviewed by me in PACS.       PROCEDURES  Procedures    EKG          EKG time: 05;18  Rhythm/Rate: Sinus bradycardia 53  P waves and WY: nml  QRS, axis: nml   ST and T waves: nml     Interpreted Contemporaneously by me, independently viewed  Unchanged compared to prior 11/11/18        PROGRESS AND CONSULTS       05:12  EKG ordered.     05;40  BP- 127/60 HR- 62 Temp- 97.5 °F (36.4 °C) (Tympanic) O2 sat- 99%  Informed pt that the EKG shows NAD. Plan for CXR. Pt understands and agrees with the plan, all questions answered.    05;42  CXR ordered.     06:18  CT chest ordered.     06:46  BP- 127/60 HR- 51 Temp- 97.5 °F (36.4 °C) (Tympanic) O2 sat- 99%  Rechecked the patient who is in NAD and is resting comfortably. Informed pt that the CT chest shows NAD. Pt will be discharged. Pt understands and agrees with the plan, all questions answered.    MEDICAL DECISION MAKING  Results were reviewed/discussed with the patient and they were also made aware of online access. Pt also made aware that some labs, such as cultures, will not be resulted during ER visit and follow up with PMD is necessary.     MDM  Number of Diagnoses or Management Options     Amount and/or Complexity of Data Reviewed  Tests in the radiology section of CPT®: ordered and reviewed (CT chest shows NAD)  Tests in the medicine section of CPT®: ordered and reviewed (See EKG procedure note. )  Discussion of test results with the performing providers: yes (Dr Nolasco)  Decide to  obtain previous medical records or to obtain history from someone other than the patient: yes  Review and summarize past medical records: yes (Seen in the ED on 11/11/18 s/t near syncope and generalized weakness)           DIAGNOSIS  Final diagnoses:   Atypical chest pain   Anxiety       DISPOSITION  DISCHARGE    Patient discharged in stable condition.    Reviewed implications of results, diagnosis, meds, responsibility to follow up, warning signs and symptoms of possible worsening, potential complications and reasons to return to ER.    Patient/Family voiced understanding of above instructions.    Discussed plan for discharge, as there is no emergent indication for admission. Patient referred to primary care provider for BP management due to today's BP. Pt/family is agreeable and understands need for follow up and repeat testing.  Pt is aware that discharge does not mean that nothing is wrong but it indicates no emergency is present that requires admission and they must continue care with follow-up as given below or physician of their choice.     FOLLOW-UP  [facility] REFERRAL SERVICE  [facility] [address]  [phone]  Schedule an appointment as soon as possible for a visit            Medication List      No changes were made to your prescriptions during this visit.       Latest Documented Vital Signs:  As of 6:50 AM  BP- 127/60 HR- 51 Temp- 97.5 °F (36.4 °C) (Tympanic) O2 sat- 99%    --  Documentation assistance provided by nevaeh Franks for Dr Murray.  Information recorded by the scribe was done at my direction and has been verified and validated by me.     Telma Franks  11/20/18 0649       Theodore Murray MD  11/20/18 0650

## 2018-12-17 ENCOUNTER — HOSPITAL ENCOUNTER (EMERGENCY)
Facility: HOSPITAL | Age: 28
Discharge: HOME | End: 2018-12-17
Attending: EMERGENCY MEDICINE | Admitting: EMERGENCY MEDICINE

## 2018-12-17 VITALS
WEIGHT: 200 LBS | BODY MASS INDEX: 24.87 KG/M2 | SYSTOLIC BLOOD PRESSURE: 106 MMHG | HEART RATE: 50 BPM | DIASTOLIC BLOOD PRESSURE: 63 MMHG | HEIGHT: 75 IN | TEMPERATURE: 98.7 F | OXYGEN SATURATION: 99 % | RESPIRATION RATE: 14 BRPM

## 2018-12-17 DIAGNOSIS — F31.60 BIPOLAR AFFECTIVE DISORDER, CURRENT EPISODE MIXED, CURRENT EPISODE SEVERITY UNSPECIFIED (HCC): Primary | ICD-10-CM

## 2018-12-17 LAB
AMPHET+METHAMPHET UR QL: NEGATIVE
BARBITURATES UR QL SCN: NEGATIVE
BENZODIAZ UR QL SCN: NEGATIVE
CANNABINOIDS SERPL QL: NEGATIVE
COCAINE UR QL: NEGATIVE
ETHANOL BLD-MCNC: <10 MG/DL (ref 0–10)
ETHANOL UR QL: <0.01 %
METHADONE UR QL SCN: NEGATIVE
OPIATES UR QL: NEGATIVE
OXYCODONE UR QL SCN: NEGATIVE

## 2018-12-17 PROCEDURE — 80307 DRUG TEST PRSMV CHEM ANLYZR: CPT | Performed by: PHYSICIAN ASSISTANT

## 2018-12-17 PROCEDURE — 99285 EMERGENCY DEPT VISIT HI MDM: CPT

## 2018-12-17 RX ORDER — OLANZAPINE 10 MG/1
10 INJECTION, POWDER, LYOPHILIZED, FOR SOLUTION INTRAMUSCULAR ONCE
Status: COMPLETED | OUTPATIENT
Start: 2018-12-17 | End: 2018-12-17

## 2018-12-17 RX ADMIN — OLANZAPINE 10 MG: 10 INJECTION, POWDER, FOR SOLUTION INTRAMUSCULAR at 11:01

## 2018-12-17 NOTE — ED PROVIDER NOTES
"EMERGENCY DEPARTMENT ENCOUNTER    Room number:  09/09  Date Seen:  12/17/2018  Time of transfer:1520  PCP:  Provider, No Known    LABORATORY RESULTS:  Recent Results (from the past 24 hour(s))   Ethanol    Collection Time: 12/17/18  7:38 AM   Result Value Ref Range    Ethanol <10 0 - 10 mg/dL    Ethanol % <0.010 %   Urine Drug Screen - Urine, Clean Catch    Collection Time: 12/17/18  9:32 AM   Result Value Ref Range    Amphet/Methamphet, Screen Negative Negative    Barbiturates Screen, Urine Negative Negative    Benzodiazepine Screen, Urine Negative Negative    Cocaine Screen, Urine Negative Negative    Opiate Screen Negative Negative    THC, Screen, Urine Negative Negative    Methadone Screen, Urine Negative Negative    Oxycodone Screen, Urine Negative Negative     I reviewed the above results.         MEDICATIONS ORDERED IN THE ED:  Medications   OLANZapine (zyPREXA) injection 10 mg (10 mg Intramuscular Given 12/17/18 1101)       PROGRESS AND CONSULT NOTES:  1520:  Patient care transferred from  pending ACCESS evaluation.    1608-Rechecked pt who is resting quietly on the stretcher. Still awaiting ACCESS consult.     1745-Rechecked pt who is resting comfortably and is easily arrousable. Pt answers questions and states \"he is ready to check out of here.\" Informed pt he needs to be evaluated by ACCESS. Pt states he is ready to talk to ACCESS at this time. Contacted ACCESS at this time for evaluation.     1825-Discussed pt case with Charlie (ACCESS) who states pt answered questions appropriately. Pt denies SI/HI at this time. She states pt is stable. Will discharge pt at this time.     DIAGNOSIS:  Final diagnoses:   Bipolar affective disorder, current episode mixed, current episode severity unspecified (CMS/[facility])       DISPOSITION:  DISCHARGE    Patient discharged in stable condition.    Reviewed implications of results, diagnosis, meds, responsibility to follow up, warning signs and symptoms of possible " worsening, potential complications and reasons to return to ER.    Patient/Family voiced understanding of above instructions.    Discussed plan for discharge, as there is no emergent indication for admission. Patient referred to primary care provider for BP management due to today's BP. Pt/family is agreeable and understands need for follow up and repeat testing.  Pt is aware that discharge does not mean that nothing is wrong but it indicates no emergency is present that requires admission and they must continue care with follow-up as given below or physician of their choice.     FOLLOW-UP  [facility] [address]  [phone]  Schedule an appointment as soon as possible for a visit       Follow up per Access referrals               Medication List      No changes were made to your prescriptions during this visit.           Provider attestation:  I personally reviewed the past medical history, past surgical history, social history, family history, current medications, and allergies as they appear in the chart.    Documentation assistance provided by nevaeh Enamorado for .  Information recorded by the scribe was done at my direction and has been verified and validated by me.       Luz Marina Enamorado  12/17/18 7779       Joaquin Young MD  12/17/18 5985

## 2018-12-17 NOTE — NURSING NOTE
"Again attempted to see pt.  Pt standing in room asking to go to the bathroom. Pointed in the direction of the bathroom, but pt states, \"There's no lid on it. That's like residential.\" Pt sits down on the bed and says, \"okay question time.\"   When asked how he got to the ED, pt responds, \"Humans brought me here.\"   Asked about his recent admission at [facility], \"Yeah I know their stuff.\"  Pt refused to answer any further questions and sat in a catatonic state, staring at the wall.  Encouraged patient to participate so that team could help him.   Pt remained silent.   Informed pt access would come back again to attempt,  As clinician was walking out, pt states, \"Can I have some cotton balls for my toenails, they're hurting my other toes.\"     During this write up, pt is out in hallway dancing, and refusing to go back into his room.   "

## 2018-12-17 NOTE — NURSING NOTE
"Spoke with mother on the phone, Lupe Apodaca. She reports pt was originally diagnosed with mental health issues in 2016 in [place], [place] when he was found wandering the [place]. Mother states he has been diagnosed with spice induced chemical imbalance, as well as bipolar disorder. Mother states that about 5 months ago, pt quit taking his medication, which included \"risperdal and something that started with a D\".      At the recommendation of a friend, mother went and took out an MIW on pt, and pt was taken and admitted to [facility]. He was discharged from there not too long ago because he \"refused any treatment\". Mother states that they did not prescribe him anything due to his reluctance to take medication. Mother reports that she kicked him out of the house at this point because she is so frustrated with him refusing help. Mother reports that pt has hidden his medical records from her, and often refuses to speak with any mental health providers. \"I think he has been living in his car since.\" Mother states that pt was at [employer] yesterday morning, and \"was normal\", aunt gave him 50$, so she has reasons to believe he possibly went out to use. Drug screen is negative, so if he did use it would have to have been a substance not tested for. Mom also states, \"My sister just called me and she said she was just up there and he's talking just fine to her.\"    Also to note, pt was seen at [facility] this AM for \"chest pain\" with no mention in any of the write ups about bizarre behavior or mental status.  Mother states, \"he has several fines and charges that if he doesn't pay, he goes to half-way. I know he doesn't want to go there.\"  Discussed with mom the process of requesting/filing for a [employer]han.   "

## 2018-12-17 NOTE — ED NOTES
Pt continues to wonder the halls after multiple attempts to re-direct Pt to his room. ED MD made aware. Pt placed on a 72 hour hold. ED charge RN and hospital security made aware.      Starla Eldridge, RN  12/17/18 9311

## 2018-12-17 NOTE — ED NOTES
Pt appears to be having bizarre behavior. Pt is starring off in space at times and then answers questions at times.      Starla Eldridge RN  12/17/18 0320

## 2018-12-17 NOTE — ED TRIAGE NOTES
Pt was brought in via ems. Found at [facility] acting aggressive. He states he was talking to the devil. Pt was found with white residue on his nose. Pt denies taking anything.

## 2018-12-17 NOTE — ED PROVIDER NOTES
"Pt is a 28 y.o. male with a hx of bipolar disorder who presents to the ED because he \"fell out of the diaz.\" He was brought in from [facility] for acting strangely and knocking things off shelves at the store. Pt reports he is not on any medications and is not followed by a psychiatrist. Hx limited due to somnolence and poor cooperation.     On exam,  Constitutional: Pt is drowsy in NAD  Cardiovascular: HRRR  Pulmonary: CTAB  Abdomen: Benign    1038 - Pt has been wandering the halls in the ER. He will not answer questions or stay in his room. Will place pt on a 72 hour hold. Ordered IM zyprexa.     1422 - Rechecked pt. Pt is somnolent in NAD. Informed pt's family of the pt's situation prior to her arrival. Family notes that the pt has a hx of drug induced psychosis and is noncompliant with his medications. Per family he was recently taken the pt to [facility], but was discharged after not cooperating with treatment. They report that the pt was recently kicked out of their house because the pt was selling things inside the home. Informed the family of the plan to continue to monitor the pt until he is able to speak with [facility]ESS for plans on disposition. She understands and agrees with plan. All questions answered.     1520 - Pt care turned over to Dr. Young pending full [facility]ESS evaluation and final disposition.       MD ATTESTATION NOTE    The JOANA and I have discussed this patient's history, physical exam, and treatment plan.  I have reviewed the documentation and personally had a face to face interaction with the patient. I affirm the documentation and agree with the treatment and plan.  The attached note describes my personal findings.      Documentation assistance provided by nevaeh Garcia for Dr. Denson. Information recorded by the scribe was done at my direction and has been verified and validated by me.            Isidro Garcia  12/17/18 3463       Edward Denson MD  12/17/18 6441    "

## 2018-12-17 NOTE — ED NOTES
Pt was asked to provide urine sample-pt currently unable to provide     Eileen Torres  12/17/18 5780

## 2018-12-17 NOTE — ED NOTES
Pt states that he is unable to provide a urine specimen at this time     Starla Eldridge, RN  12/17/18 0744

## 2018-12-17 NOTE — ED NOTES
"Pt calm and cooperative.  Answers questions slowly with very short answers.  When asked how he felt he stated \"relaxed\".  Pt states he was tearing things up at [facility] because he didn't like the team on the shirts. Pt was asked if he took any drugs.  He stated Zyprexa.  When asked why he took Zyprexa he said \"knowledge\".  Pt states he lives in his car.  He does not have a job.  When asked how he gets money, he stated \"resources\".  Pt denies SI/HI.  Pt appears in no acute distress at this time.         Lalita Sandoval, RN  12/17/18 0656    "

## 2018-12-17 NOTE — NURSING NOTE
"Received call from ED that pt was awake and ready to talk.  still at bedside. Upon entering, pt woke and was cooperative. Clinician asked if pt remembered her visit from earlier, pt states, \"Yes\", pt also admits to purposely ignoring clinicians questions. \"I was just doing what I chose to do.\"     Pt reports he used to live with his parents however they got into it, \"They didn't actually kick me out, I just decided to go a separate way in the direction of my career and interests.\" PT states he is interested in music and entertainment. Reports that he technically lives in his car, but stays with friends pretty often. Reports that he has a good relationship with his parents but he does not want to return to their home.     States that he stopped taking his medications 5 months ago \"because I don't need it.\" Pt cannot identify which medications he was on. States that he was discharged from [facility] sometime around December 1.    When asked if pt remembered talking to clinician in child-like voice, pt states, \"Yeah I remember. That's a character from my art. I choose how I want to act and how I want to talk and that's who I wanted to be at that moment.\" Confirmed that pt cognizantly makes the decision to act in this manner. Pt states, \"Yeah I'm just playing. I like to play. That's why I ended up at [facility]. I had been at the club, entertaining, playing a role and having fun and I wasn't ready for it to end yet. So I was real close to [facility], I walked in and continued to play.\"   When asked about drugs, pt adamantly denies that he uses any. When reminded that his mother had informed staff of his use of spice, and the possibility of spice induced psychosis, pt got defensive and said, \"I said I don't do drugs so I don't know why that matters.\"       Later pt again repeats, \"I am free to speak and do whatever I want, and I choose to play.\"    Pt denies any HI/SI. Denies that he has ever attempted to end " "his life or harm himself in anyway. Pt denies any type of AVH, and does not appear to be responding to any internal stimuli. When clinician asked what made pt change his mind and want to talk pt states, \"Cause I've been stuck here all day. I'm hungry.\" Pt voices that his plan is to leave here and go get food and stay with a friend tonight (future orientation).    Upon conclusion of interview, pt asked clinician, \"Can I please turn on the TV now?\"    Discussed with ED MD who agrees with disposition to discharge pt home. No referrals given due to pt refusing services and being noncompliant with all treatment.   "

## 2018-12-17 NOTE — NURSING NOTE
"AC attempted to speak with patient.   At first pt woke up and states, \"I can answer questions just fine\" in his normal voice and tone.   However after questions began, pt started talking in a high pitched tone, and speaking like a toddler.   \"I'm two or tree.\" and when asked why he was here, \"Mommy left me at the store.\"    Pt keeps eyes closed during entire interaction. Pt again returns to his normal voice to tell me his parents live in [facility] but he has been living out of his car. When asked why his voice and tone changes, he replies, \"I have many voices.\" Reports he was at [employer] \"taking in some blessings.\" When asked to elaborate he states, \"oh you know just the freedom to move around.\"    Pt is very somnolent at this time, and unable to give full (or appropriate) answers. Discussed with ED MD who agrees with plan to let pt sleep, and when he wakes obtain his urine specimen and reattempt evaluation.   "

## 2018-12-17 NOTE — NURSING NOTE
"AC Clinician on site as security arrived to assist RN in giving pt medication as well as checking his pockets and removing his personal belongings.   After medication was administered, pt was asked to sit up and change from his sweatshirt to a gown. Pt states, \"I can't I'm too medicated,\" while closing his eyes, pretending to be unconscious and transitioning his body to dead weight, requiring security to have to help him sit up and move his arms. Team was able to remove his sweatshirt and undershirt along with his shoes. When security told pt the next step was to remove the pants, pt sat up and immediately said, \"I aint taking my pants off.\"    Pt did reluctantly allow security to check his pockets, where they removed his wallet and a comb.   At this time, pt asked for assistance in sitting up the head of the bed, and then said, \"okay where's the remote?\"    Access clinician entered room and asked if pt was ready to comply with interview. Pt remained silent.Informed pt that mother had told clinician that he often ignores anyone in mental health \"because he doesn't want the treatment.\" When asked if this was true, pt said, \"yeah\".  Informed pt that a disposition could not be reached until he was cooperative, and again ask pt if he is ready to answer questions. Pt says \"yes\"    When asked why he was in [facility], \"To wreck shit\". When asked what made him angry, \"I wasn't angry, it was supposed to happen.\" Pt denies that anyone or any voices had told him to do this.  When asked when he was discharged from [facility], he says \"I don't know\", when trying to explore deeper pt states, \"That was my answer.\"    When asked about the last time pt used Spice, he replies \"300 years ago\" with laughter.    Clinician states that pt had agreed to be cooperative and answer questions appropriately.   \"I said I'd answer them. I didn't say appropriately.\"    Again informed pt that clinician would return at a later time to see if he was " ready to cooperate. Until then, security is at bedside.

## 2018-12-17 NOTE — ED NOTES
Pt notified that mother was calling to check on pt, pt refused to speak with mother. Mother gave RN her phone number, [phone]. Per mother, pt was discharged last week after spending 7 days at [facility].      Lyndsey Alfonso RN  12/17/18 0900

## 2018-12-17 NOTE — ED PROVIDER NOTES
"EMERGENCY DEPARTMENT ENCOUNTER    Room Number:  09/09  Date seen:  12/17/2018  Time seen: 6:38 AM  PCP: Provider, No Known   History provided by- patient, EMS, medical records  History limited by- psychiatric disorder    HPI:  Chief complaint: behavior problem  Context:Roly Apodaca is a 28 y.o. male who has hx of bipolar disorder and schizophrenia. Per EMS, today, pt was found at [facility] acting aggressively and erratically; eg knocking objects off of the shelves. EMS also noticed white residue on pt's nose. In ED, when asked what he was doing at [facility], pt states \"I was throwing away the [employer] sweatshirts because I don't like that team\". He also reports that he takes zyprexa for \"knowledge\", uses \"drugs occasionally\", but has not used any illicit drugs within the past 48 hours. He denies SI/HI and ETOH use. Additionally, he reports that he lives in his car and gets \"money from resources\". There are no other complaints at this time.     Onset: unknown  Location: psych  Radiation: unknown  Duration: found today with sx  Timing: unknown  Character: aggressive behavior, erratic behavior  Aggravating Factors: unknown  Alleviating Factors: unknown  Severity: unknown    MEDICAL RECORD REVIEW  Pt has been seen at [facility] several times since 2016 for various reasons including drug induced paranoia, anxiety, and bipolar disorder. Pt was last seen at [facility] on 11/20/18 for atypical chest pain and anxiety. Pt was also seen at [facility] yesterday for chest wall pain.     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 No pertinent surgical history.    FAMILY HISTORY  History reviewed. No pertinent family history.    SOCIAL HISTORY  Social History     Socioeconomic History   • Marital status: Single     Spouse name: Not on file   • Number of children: Not on file   • Years of education: Not on file   • Highest education level: Not on file   Social Needs   • Financial resource strain: Not on file   • Food insecurity - worry: Not on file   • Food insecurity - inability: Not on file   • Transportation needs - medical: Not on file   • Transportation needs - non-medical: Not on file   Occupational History   • Not on file   Tobacco Use   • Smoking status: Former Smoker   • Tobacco comment: quit March 7   Substance and Sexual Activity   • Alcohol use: No   • Drug use: Yes     Types: Marijuana   • Sexual activity: Defer   Other Topics Concern   • Not on file   Social History Narrative   • Not on file       REVIEW OF SYSTEMS  Review of Systems   Unable to perform ROS: Psychiatric disorder   Psychiatric/Behavioral: Positive for behavioral problems (aggressive behavior, erratic behavior). Negative for suicidal ideas.        Denies homicidal ideation       PHYSICAL EXAM  ED Triage Vitals [12/17/18 0628]   Temp Heart Rate Resp BP SpO2   98.7 °F (37.1 °C) 96 20 136/90 98 % WNL      Temp src Heart Rate Source Patient Position BP Location FiO2 (%)   Tympanic -- -- -- --     Physical Exam   Constitutional: He is oriented to person, place, and time and well-developed, well-nourished, and in no distress.   HENT:   Head: Normocephalic and atraumatic.   Right Ear: External ear normal.   Left Ear: External ear normal.   Fine white powder in right nare   Eyes: Conjunctivae and EOM are normal. Pupils are equal, round, and reactive to light.   Neck: Normal range of motion.   Cardiovascular: Normal rate and regular rhythm.   Pulmonary/Chest: Effort normal and breath sounds normal. No respiratory  distress. He has no wheezes. He has no rhonchi. He has no rales.   Abdominal: Soft. He exhibits no distension. There is no tenderness.   Neurological: He is oriented to person, place, and time. He has normal motor skills and normal sensation.   Follows commands, moves all extremities, drowsy but answers questions, alerts easily to voice, nonfocal neuro exam   Skin: Skin is warm and dry.   Psychiatric: His affect is inappropriate. He expresses no homicidal and no suicidal ideation.   Nursing note and vitals reviewed.      LAB RESULTS  Recent Results (from the past 24 hour(s))   Ethanol    Collection Time: 12/17/18  7:38 AM   Result Value Ref Range    Ethanol <10 0 - 10 mg/dL    Ethanol % <0.010 %   Urine Drug Screen - Urine, Clean Catch    Collection Time: 12/17/18  9:32 AM   Result Value Ref Range    Amphet/Methamphet, Screen Negative Negative    Barbiturates Screen, Urine Negative Negative    Benzodiazepine Screen, Urine Negative Negative    Cocaine Screen, Urine Negative Negative    Opiate Screen Negative Negative    THC, Screen, Urine Negative Negative    Methadone Screen, Urine Negative Negative    Oxycodone Screen, Urine Negative Negative       I ordered the above labs and reviewed the results        MEDICATIONS GIVEN IN ER  Medications   OLANZapine (zyPREXA) injection 10 mg (10 mg Intramuscular Given 12/17/18 1101)           PROCEDURES  Procedures      PROGRESS AND CONSULTS    Progress Notes:    ED Course as of Dec 17 1620   Mon Dec 17, 2018   1302 Patient is now sleeping  [WH]      ED Course User Index  [WH] Edward Denson MD     0645- Ordered BAL and UDS for further evaluation. Consulted Access.     0663- Reviewed pt's history and workup with Dr. Denson.  After a bedside evaluation; Dr Denson agrees with the plan of care.     0802- Rechecked pt. He is sleeping comfortably and is in no acute distress.     1000- Transferred care to Dr Denson. See Dr Denson's note for details.       Disposition  "vitals:  /67 (Patient Position: Lying)   Pulse 62   Temp 98.7 °F (37.1 °C) (Tympanic)   Resp 16   Ht 190.5 cm (75\")   Wt 90.7 kg (200 lb)   SpO2 98%   BMI 25.00 kg/m²           Documentation assistance provided by nevaeh Mcmahan for Anitha Bergeron PA-C.  Information recorded by the scribe was done at my direction and has been verified and validated by me.      Wilmer Mcmahan  12/17/18 1234       Anitha Bergeron PA  12/17/18 1621    "